# Patient Record
Sex: MALE | Race: WHITE | URBAN - METROPOLITAN AREA
[De-identification: names, ages, dates, MRNs, and addresses within clinical notes are randomized per-mention and may not be internally consistent; named-entity substitution may affect disease eponyms.]

---

## 2017-06-15 ENCOUNTER — OFFICE VISIT (OUTPATIENT)
Dept: OPHTHALMOLOGY | Facility: CLINIC | Age: 82
End: 2017-06-15
Payer: COMMERCIAL

## 2017-06-15 DIAGNOSIS — H35.30 MACULAR DEGENERATION: ICD-10-CM

## 2017-06-15 DIAGNOSIS — H01.023 SQUAMOUS BLEPHARITIS OF BOTH EYES, UNSPECIFIED EYELID: ICD-10-CM

## 2017-06-15 DIAGNOSIS — Z01.01 ENCOUNTER FOR EXAMINATION OF EYES AND VISION WITH ABNORMAL FINDINGS: Primary | ICD-10-CM

## 2017-06-15 DIAGNOSIS — H52.4 PRESBYOPIA: ICD-10-CM

## 2017-06-15 DIAGNOSIS — H01.026 SQUAMOUS BLEPHARITIS OF BOTH EYES, UNSPECIFIED EYELID: ICD-10-CM

## 2017-06-15 DIAGNOSIS — H35.372 EPIRETINAL MEMBRANE, LEFT: ICD-10-CM

## 2017-06-15 DIAGNOSIS — H43.813 POSTERIOR VITREOUS DETACHMENT, BILATERAL: ICD-10-CM

## 2017-06-15 DIAGNOSIS — Z96.1 PSEUDOPHAKIA: ICD-10-CM

## 2017-06-15 PROCEDURE — 92014 COMPRE OPH EXAM EST PT 1/>: CPT | Performed by: OPHTHALMOLOGY

## 2017-06-15 PROCEDURE — 92015 DETERMINE REFRACTIVE STATE: CPT | Performed by: OPHTHALMOLOGY

## 2017-06-15 ASSESSMENT — VISUAL ACUITY
OD_CC: J2
OS_SC: 20/20-2
METHOD: SNELLEN - LINEAR
OS_CC: J1
OD_SC: 20/25+3

## 2017-06-15 ASSESSMENT — SLIT LAMP EXAM - LIDS
COMMENTS: 2+ DERMATOCHALASIS - UPPER LID, 2+ MEIBOMIAN GLAND DYSFUNCTION
COMMENTS: 2+ DERMATOCHALASIS - UPPER LID, 2+ MEIBOMIAN GLAND DYSFUNCTION

## 2017-06-15 ASSESSMENT — CONF VISUAL FIELD
OS_NORMAL: 1
OD_NORMAL: 1

## 2017-06-15 ASSESSMENT — TONOMETRY
OD_IOP_MMHG: 10
IOP_METHOD: APPLANATION
OS_IOP_MMHG: 10

## 2017-06-15 ASSESSMENT — REFRACTION_WEARINGRX
OD_SPHERE: +0.25
OS_CYLINDER: +0.75
OS_AXIS: 177
OS_ADD: +2.75
OD_AXIS: 007
SPECS_TYPE: BIFOCAL
OD_ADD: +2.75
OS_SPHERE: -0.25
OD_CYLINDER: +0.50

## 2017-06-15 ASSESSMENT — CUP TO DISC RATIO
OS_RATIO: 0.1
OD_RATIO: 0.1

## 2017-06-15 ASSESSMENT — REFRACTION_MANIFEST
OD_ADD: +2.75
OS_SPHERE: -0.50
OD_CYLINDER: +0.75
OD_SPHERE: +0.50
OS_AXIS: 175
OD_AXIS: 180
OS_CYLINDER: +1.00
OS_ADD: +2.75

## 2017-06-15 ASSESSMENT — EXTERNAL EXAM - RIGHT EYE: OD_EXAM: PROLAPSED FAT PADS: UPPER, LOWER, ROSACEA

## 2017-06-15 ASSESSMENT — EXTERNAL EXAM - LEFT EYE: OS_EXAM: PROLAPSED FAT PADS: UPPER, LOWER, ROSACEA

## 2017-06-15 NOTE — MR AVS SNAPSHOT
"              After Visit Summary   6/15/2017    Abe Nichole    MRN: [id number]           Patient Information     Date Of Birth          7/30/1927        Visit Information        Provider Department      6/15/2017 2:00 PM Miguel Lamar MD [facility]        Today's Diagnoses     Encounter for examination of eyes and vision with abnormal findings    -  1    Presbyopia        Posterior vitreous detachment, bilateral        Squamous blepharitis of both eyes, unspecified eyelid        Epiretinal membrane, mild, left        Pseudophakia, ou        Macular degeneration of both eyes          Care Instructions    Possible clouding of posterior capsule discussed.   Take a multiple vitamin or \"eye vitamin\" daily.  Protect your eyes outdoors from ultraviolet rays with sunglasses and/or brimmed hat.  Have spinach (cooked or raw), colorful fruits, walnuts, hazelnuts, almonds in your diet.  Monitor the vision in each eye weekly - call if any sudden persistent changes.   Glasses Rx given - optional   Call in February 2018 for an appointment in June 2018 for Complete Exam    Dr. Lamar [phone]          Follow-ups after your visit        Who to contact     If you have questions or need follow up information about today's clinic visit or your schedule please contact [facility] directly at [phone].  Normal or non-critical lab and imaging results will be communicated to you by MyChart, letter or phone within 4 business days after the clinic has received the results. If you do not hear from us within 7 days, please contact the clinic through MyChart or phone. If you have a critical or abnormal lab result, we will notify you by phone as soon as possible.  Submit refill requests through [employer] or call your pharmacy and they will forward the refill request to us. Please allow 3 business days for your refill to be completed.          Additional Information About Your Visit        MyChart " "Information     [employer] lets you send messages to your doctor, view your test results, renew your prescriptions, schedule appointments and more. To sign up, go to [email or web address] . Click on \"Log in\" on the left side of the screen, which will take you to the Welcome page. Then click on \"Sign up Now\" on the right side of the page.     You will be asked to enter the access code listed below, as well as some personal information. Please follow the directions to create your username and password.     Your access code is: [email or web address]  Expires: 2017  3:05 PM     Your access code will  in 90 days. If you need help or a new code, please call your [place] clinic or [phone].        Care EveryWhere ID     This is your Care EveryWhere ID. This could be used by other organizations to access your [place] medical records  [id number]         Blood Pressure from Last 3 Encounters:   13 118/52   13 130/50   06/10/13 116/54    Weight from Last 3 Encounters:   13 99.3 kg (219 lb)   13 100.3 kg (221 lb 3.2 oz)   06/10/13 99.8 kg (220 lb)              We Performed the Following     EYE EXAM (SIMPLE-NONBILLABLE)     REFRACTIVE STATUS        Primary Care Provider    Md Other Clinic                Thank you!     Thank you for choosing [facility]  for your care. Our goal is always to provide you with excellent care. Hearing back from our patients is one way we can continue to improve our services. Please take a few minutes to complete the written survey that you may receive in the mail after your visit with us. Thank you!             Your Updated Medication List - Protect others around you: Learn how to safely use, store and throw away your medicines at www.disposemymeds.org.          This list is accurate as of: 6/15/17  3:05 PM.  Always use your most recent med list.                   Brand Name Dispense Instructions for use    acetaminophen 500 MG tablet    TYLENOL     Take "  by mouth every 6 hours. 1 1/2 tablets.       * albuterol 108 (90 BASE) MCG/ACT Inhaler   Generic drug:  albuterol     1 Inhaler    Inhale 2 puffs into the lungs every 4 hours as needed for shortness of breath / dyspnea.       * albuterol (2.5 MG/3ML) 0.083% neb solution     11 Box    Take 3 mLs by nebulization 4 times daily.       aspirin 325 MG EC tablet      Take 325 mg by mouth daily.       CALCIUM 600 PO      1 DAILY       diltiazem 180 MG 24 hr capsule    DILACOR XR    90 capsule    Take 1 capsule by mouth daily.       fish oil-omega-3 fatty acids 1000 MG capsule      2 CAPS DAILY       GLUCOSAMINE CHONDROITIN COMPLX PO      Take  by mouth daily.       HYALURONIC ACID PO      Take 50 mg by mouth daily.       loratadine 10 MG tablet    CLARITIN     1 TABLET DAILY       Lovastatin 40 MG Tb24     180 tablet    Take 2 tablets by mouth daily.       MULTI-VITAMIN PO      1 DAILY       nitroglycerin 0.4 MG sublingual tablet    NITROSTAT    25    ONE TABLET UNDER TONGUE, FOR CHEST PAIN, AS DIRECTED;1 every 5 mins ;max of 3 in 15 mins.       order for DME     1 Units    Equipment being ordered: posterior-op shoe       spironolactone-hydrochlorothiazide 25-25 MG per tablet    ALDACTAZIDE    180 tablet    Take 1 tablet by mouth 2 times daily.       sulfacetamide-prednisoLONE 10-0.23 % ophthalmic solution    VASOCIDIN    5 mL    Place 1 drop into both eyes 2 times daily as needed.       vitamin D 2000 UNITS tablet      1 TAB ONCE DAILY       * Notice:  This list has 2 medication(s) that are the same as other medications prescribed for you. Read the directions carefully, and ask your doctor or other care provider to review them with you.

## 2017-06-15 NOTE — PROGRESS NOTES
" Current Eye Medications:  None     Subjective:  Complete exam, both eyes doing well, no new issues or concerns.     Objective:  See Ophthalmology Exam.       Assessment:  Stable eye exam.      ICD-10-CM    1. Encounter for examination of eyes and vision with abnormal findings Z01.01 REFRACTIVE STATUS   2. Presbyopia H52.4 REFRACTIVE STATUS   3. Pseudophakia, ou Z96.1 EYE EXAM (SIMPLE-NONBILLABLE)   4. Macular degeneration, dry, mild, ou H35.30    5. Epiretinal membrane, mild, left H35.372    6. 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 Posterior vitreous detachment, bilateral H43.813         Plan: Possible clouding of posterior capsule discussed.   Take a multiple vitamin or \"eye vitamin\" daily.  Protect your eyes outdoors from ultraviolet rays with sunglasses and/or brimmed hat.  Have spinach (cooked or raw), colorful fruits, walnuts, hazelnuts, almonds in your diet.  Monitor the vision in each eye weekly - call if any sudden persistent changes.   Glasses Rx given - optional   Call in February 2018 for an appointment in June 2018 for Complete Exam    Dr. Lamar [phone]    "

## 2017-06-15 NOTE — PATIENT INSTRUCTIONS
"Possible clouding of posterior capsule discussed.   Take a multiple vitamin or \"eye vitamin\" daily.  Protect your eyes outdoors from ultraviolet rays with sunglasses and/or brimmed hat.  Have spinach (cooked or raw), colorful fruits, walnuts, hazelnuts, almonds in your diet.  Monitor the vision in each eye weekly - call if any sudden persistent changes.   Glasses Rx given - optional   Call in February 2018 for an appointment in June 2018 for Complete Exam    Dr. Lamar [phone]  "

## 2017-06-17 PROBLEM — H35.30 MACULAR DEGENERATION: Status: ACTIVE | Noted: 2017-06-17

## 2017-06-17 PROBLEM — H35.30 MACULAR DEGENERATION OF BOTH EYES: Status: RESOLVED | Noted: 2017-06-15 | Resolved: 2017-06-17

## 2018-06-22 ENCOUNTER — OFFICE VISIT (OUTPATIENT)
Dept: OPHTHALMOLOGY | Facility: CLINIC | Age: 83
End: 2018-06-22
Payer: COMMERCIAL

## 2018-06-22 DIAGNOSIS — H43.813 POSTERIOR VITREOUS DETACHMENT, BILATERAL: ICD-10-CM

## 2018-06-22 DIAGNOSIS — H01.026 SQUAMOUS BLEPHARITIS OF BOTH EYES, UNSPECIFIED EYELID: ICD-10-CM

## 2018-06-22 DIAGNOSIS — H35.30 MACULAR DEGENERATION: ICD-10-CM

## 2018-06-22 DIAGNOSIS — H52.4 PRESBYOPIA: ICD-10-CM

## 2018-06-22 DIAGNOSIS — Z01.01 ENCOUNTER FOR EXAMINATION OF EYES AND VISION WITH ABNORMAL FINDINGS: Primary | ICD-10-CM

## 2018-06-22 DIAGNOSIS — Z96.1 PSEUDOPHAKIA: ICD-10-CM

## 2018-06-22 DIAGNOSIS — H35.372 EPIRETINAL MEMBRANE, LEFT: ICD-10-CM

## 2018-06-22 DIAGNOSIS — H01.023 SQUAMOUS BLEPHARITIS OF BOTH EYES, UNSPECIFIED EYELID: ICD-10-CM

## 2018-06-22 PROCEDURE — 92014 COMPRE OPH EXAM EST PT 1/>: CPT | Performed by: OPHTHALMOLOGY

## 2018-06-22 PROCEDURE — 92015 DETERMINE REFRACTIVE STATE: CPT | Performed by: OPHTHALMOLOGY

## 2018-06-22 ASSESSMENT — REFRACTION_MANIFEST
OD_AXIS: 005
OS_SPHERE: -1.00
OD_ADD: +3.00
OD_SPHERE: +0.25
OS_CYLINDER: +0.75
OD_CYLINDER: +0.75
OS_AXIS: 005
OS_ADD: +3.00

## 2018-06-22 ASSESSMENT — VISUAL ACUITY
OS_CC: 20/40
OS_CC+: -1
OS_CC: J1+
METHOD: SNELLEN - LINEAR
CORRECTION_TYPE: GLASSES
OD_CC: J1+
OD_CC: 20/30+-

## 2018-06-22 ASSESSMENT — REFRACTION_WEARINGRX
OD_CYLINDER: +0.50
OS_CYLINDER: +0.75
SPECS_TYPE: BIFOCAL
OD_ADD: +2.75
OS_SPHERE: -0.25
OD_AXIS: 007
OS_AXIS: 177
OD_SPHERE: +0.25
OS_ADD: +2.75

## 2018-06-22 ASSESSMENT — CONF VISUAL FIELD
OS_NORMAL: 1
OD_NORMAL: 1

## 2018-06-22 ASSESSMENT — EXTERNAL EXAM - LEFT EYE: OS_EXAM: PROLAPSED FAT PADS: UPPER, LOWER, ROSACEA

## 2018-06-22 ASSESSMENT — CUP TO DISC RATIO
OD_RATIO: 0.1
OS_RATIO: 0.1

## 2018-06-22 ASSESSMENT — TONOMETRY
IOP_METHOD: APPLANATION
OD_IOP_MMHG: 10
OS_IOP_MMHG: 10

## 2018-06-22 ASSESSMENT — EXTERNAL EXAM - RIGHT EYE: OD_EXAM: PROLAPSED FAT PADS: UPPER, LOWER, ROSACEA

## 2018-06-22 NOTE — PATIENT INSTRUCTIONS
"Glasses Rx given - optional.  Possible clouding of posterior capsule both eyes discussed.  Take a multiple vitamin or \"eye vitamin\" daily.  Protect your eyes outdoors from ultraviolet rays with sunglasses and/or brimmed hat.  Have spinach (cooked or raw), colorful fruits, walnuts, hazelnuts, almonds in your diet.  Monitor the vision in each eye weekly - call if any sudden persistent changes.  Call in February 2019 for an appointment in June 2019 for Complete Exam.    Dr. Lamar [phone]    "

## 2018-06-22 NOTE — PROGRESS NOTES
" Current Eye Medications:  None     Subjective:  Complete eye exam    Doing OK.  Little trouble reading, but not too much     Objective:  See Ophthalmology Exam.       Assessment:  Stable eye exam.      ICD-10-CM    1. Encounter for examination of eyes and vision with abnormal findings Z01.01 REFRACTIVE STATUS   2. Presbyopia H52.4 REFRACTIVE STATUS   3. Pseudophakia, ou Z96.1 EYE EXAM (SIMPLE-NONBILLABLE)   4. Macular degeneration, dry, mild, ou H35.30    5. Epiretinal membrane, mild, left H35.372    6. 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 Posterior vitreous detachment, bilateral H43.813         Plan: Glasses Rx given - optional.  Possible clouding of posterior capsule both eyes discussed.  Take a multiple vitamin or \"eye vitamin\" daily.  Protect your eyes outdoors from ultraviolet rays with sunglasses and/or brimmed hat.  Have spinach (cooked or raw), colorful fruits, walnuts, hazelnuts, almonds in your diet.  Monitor the vision in each eye weekly - call if any sudden persistent changes.  Call in February 2019 for an appointment in June 2019 for Complete Exam.    Dr. Lamar [phone]       "

## 2018-06-22 NOTE — MR AVS SNAPSHOT
"              After Visit Summary   6/22/2018    Abe Nichole    MRN: [id number]           Patient Information     Date Of Birth          7/30/1927        Visit Information        Provider Department      6/22/2018 1:00 PM Miguel Lamar MD [facility]        Today's Diagnoses     Encounter for examination of eyes and vision with abnormal findings    -  1    Presbyopia        Macular degeneration, dry, mild, ou        Posterior vitreous detachment, bilateral        Squamous blepharitis of both eyes, unspecified eyelid          Care Instructions    Glasses Rx given - optional.  Possible clouding of posterior capsule both eyes discussed.  Take a multiple vitamin or \"eye vitamin\" daily.  Protect your eyes outdoors from ultraviolet rays with sunglasses and/or brimmed hat.  Have spinach (cooked or raw), colorful fruits, walnuts, hazelnuts, almonds in your diet.  Monitor the vision in each eye weekly - call if any sudden persistent changes.  Call in February 2019 for an appointment in June 2019 for Complete Exam.    Dr. Lamar [phone]            Follow-ups after your visit        Who to contact     If you have questions or need follow up information about today's clinic visit or your schedule please contact [facility] directly at [phone].  Normal or non-critical lab and imaging results will be communicated to you by MyChart, letter or phone within 4 business days after the clinic has received the results. If you do not hear from us within 7 days, please contact the clinic through MyChart or phone. If you have a critical or abnormal lab result, we will notify you by phone as soon as possible.  Submit refill requests through [employer] or call your pharmacy and they will forward the refill request to us. Please allow 3 business days for your refill to be completed.          Additional Information About Your Visit        Care EveryWhere ID     This is your Care EveryWhere ID. " This could be used by other organizations to access your [place] medical records  [id number]         Blood Pressure from Last 3 Encounters:   09/19/13 118/52   07/25/13 130/50   06/10/13 116/54    Weight from Last 3 Encounters:   09/19/13 99.3 kg (219 lb)   07/25/13 100.3 kg (221 lb 3.2 oz)   06/10/13 99.8 kg (220 lb)              Today, you had the following     No orders found for display       Primary Care Provider Fax #    Physician No Ref-Primary [phone]       No address on file        Equal Access to Services     [facility]: Hadii gordy Santiago, wabridgerda armen, jenny hammermaherbie carlson, bozena rutherford . So [facility] [phone].    ATENCIÓN: Si habla español, tiene a dumont disposición servicios gratuitos de asistencia lingüística. Llame al [phone].    We comply with applicable federal civil rights laws and [place] laws. We do not discriminate on the basis of race, color, national origin, age, disability, sex, sexual orientation, or gender identity.            Thank you!     Thank you for choosing [facility]  for your care. Our goal is always to provide you with excellent care. Hearing back from our patients is one way we can continue to improve our services. Please take a few minutes to complete the written survey that you may receive in the mail after your visit with us. Thank you!             Your Updated Medication List - Protect others around you: Learn how to safely use, store and throw away your medicines at www.disposemymeds.org.          This list is accurate as of 6/22/18  1:42 PM.  Always use your most recent med list.                   Brand Name Dispense Instructions for use Diagnosis    acetaminophen 500 MG tablet    TYLENOL     Take  by mouth every 6 hours. 1 1/2 tablets.        aspirin 325 MG EC tablet      Take 325 mg by mouth daily.        CALCIUM 600 PO      1 DAILY        diltiazem 180 MG 24 hr capsule    DILACOR XR    90 capsule     Take 1 capsule by mouth daily.    Hypertension       fish oil-omega-3 fatty acids 1000 MG capsule      2 CAPS DAILY        GLUCOSAMINE CHONDROITIN COMPLX PO      Take  by mouth daily.        HYALURONIC ACID PO      Take 50 mg by mouth daily.        loratadine 10 MG tablet    CLARITIN     1 TABLET DAILY        Lovastatin 40 MG Tb24     180 tablet    Take 2 tablets by mouth daily.    Hyperlipidemia LDL goal < 100       MULTI-VITAMIN PO      1 DAILY        nitroGLYcerin 0.4 MG sublingual tablet    NITROSTAT    25    ONE TABLET UNDER TONGUE, FOR CHEST PAIN, AS DIRECTED;1 every 5 mins ;max of 3 in 15 mins.    Chest pain       order for DME     1 Units    Equipment being ordered: posterior-op shoe        * PROAIR  (90 Base) MCG/ACT Inhaler   Generic drug:  albuterol     1 Inhaler    Inhale 2 puffs into the lungs every 4 hours as needed for shortness of breath / dyspnea.    COPD (chronic obstructive pulmonary disease) (H)       * albuterol (2.5 MG/3ML) 0.083% neb solution     11 Box    Take 3 mLs by nebulization 4 times daily.    COPD (chronic obstructive pulmonary disease) (H)       spironolactone-hydrochlorothiazide 25-25 MG per tablet    ALDACTAZIDE    180 tablet    Take 1 tablet by mouth 2 times daily.    Hypertension       sulfacetamide-prednisoLONE 10-0.23 % ophthalmic solution    VASOCIDIN    5 mL    Place 1 drop into both eyes 2 times daily as needed.    Blepharitis       vitamin D 2000 units tablet      1 TAB ONCE DAILY        * Notice:  This list has 2 medication(s) that are the same as other medications prescribed for you. Read the directions carefully, and ask your doctor or other care provider to review them with you.

## 2018-06-22 NOTE — LETTER
"    6/22/2018         RE: Abe Nichole  [address]  [place] [address]        Dear Colleague,    Thank you for referring your patient, Abe Nichole, to the [facility]. Please see a copy of my visit note below.     Current Eye Medications:  None     Subjective:  Complete eye exam    Doing OK.  Little trouble reading, but not too much     Objective:  See Ophthalmology Exam.       Assessment:  Stable eye exam.      ICD-10-CM    1. Encounter for examination of eyes and vision with abnormal findings Z01.01 REFRACTIVE STATUS   2. Presbyopia H52.4 REFRACTIVE STATUS   3. Pseudophakia, ou Z96.1 EYE EXAM (SIMPLE-NONBILLABLE)   4. Macular degeneration, dry, mild, ou H35.30    5. Epiretinal membrane, mild, left H35.372    6. 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 Posterior vitreous detachment, bilateral H43.813         Plan: Glasses Rx given - optional.  Possible clouding of posterior capsule both eyes discussed.  Take a multiple vitamin or \"eye vitamin\" daily.  Protect your eyes outdoors from ultraviolet rays with sunglasses and/or brimmed hat.  Have spinach (cooked or raw), colorful fruits, walnuts, hazelnuts, almonds in your diet.  Monitor the vision in each eye weekly - call if any sudden persistent changes.  Call in February 2019 for an appointment in June 2019 for Complete Exam.    Dr. Lamar [phone]         Again, thank you for allowing me to participate in the care of your patient.        Sincerely,        Miguel Lamar MD    "

## 2018-09-28 ENCOUNTER — OFFICE VISIT (OUTPATIENT)
Dept: OPHTHALMOLOGY | Facility: CLINIC | Age: 83
End: 2018-09-28
Payer: COMMERCIAL

## 2018-09-28 DIAGNOSIS — H50.00 ESODEVIATION: ICD-10-CM

## 2018-09-28 DIAGNOSIS — H53.2 DIPLOPIA: Primary | ICD-10-CM

## 2018-09-28 PROCEDURE — 92012 INTRM OPH EXAM EST PATIENT: CPT | Performed by: OPHTHALMOLOGY

## 2018-09-28 ASSESSMENT — REFRACTION_FINALRX
OS_HBASE: OUT
OS_HPRISM: 2.0

## 2018-09-28 ASSESSMENT — CONF VISUAL FIELD
OD_NORMAL: 1
OS_NORMAL: 1

## 2018-09-28 ASSESSMENT — VISUAL ACUITY
OS_CC: 20/25
OD_CC: 20/25
CORRECTION_TYPE: GLASSES
METHOD: SNELLEN - LINEAR

## 2018-09-28 ASSESSMENT — EXTERNAL EXAM - RIGHT EYE: OD_EXAM: PROLAPSED FAT PADS: UPPER, LOWER, ROSACEA

## 2018-09-28 ASSESSMENT — EXTERNAL EXAM - LEFT EYE: OS_EXAM: PROLAPSED FAT PADS: UPPER, LOWER, ROSACEA

## 2018-09-28 NOTE — PATIENT INSTRUCTIONS
Discontinue using Visine drops both eyes.  Try using artifical tears up to 4 times daily both eyes as needed.  (Refresh Tears, Systane Ultra/Balance, or Theratears)  Recommend trying temporary prism on left lens.  Return visit in 1 month for Muscle Check.     Miguel Lamar M.D.  [phone]

## 2018-09-28 NOTE — PROGRESS NOTES
Current Eye Medications:  None     Subjective:  Motility     Patient complains that with head movement things get blurry.  Like trying to see under water without goggles. He saw Dr. Hall and was told to have his eyes checked (motility).    On tapering dose of Prednisone.  Had scans at [place].    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       Assessment:  Possible symptomatic esodeviation.      Plan:  Discontinue using Visine drops both eyes.  Try using artifical tears up to 4 times daily both eyes as needed.  (Refresh Tears, Systane Ultra/Balance, or Theratears)  Recommend trying temporary prism on left lens.  Return visit in 1 month for Muscle Check.     Miguel Lamar M.D.  [phone]

## 2018-09-28 NOTE — MR AVS SNAPSHOT
After Visit Summary   9/28/2018    Abe Nichole    MRN: [id number]           Patient Information     Date Of Birth          7/30/1927        Visit Information        Provider Department      9/28/2018 11:00 AM Miguel Lamar MD [facility]        Today's Diagnoses     Diplopia    -  1      Care Instructions    Discontinue using Visine drops both eyes.  Try using artifical tears up to 4 times daily both eyes as needed.  (Refresh Tears, Systane Ultra/Balance, or Theratears)  Recommend trying temporary prism on left lens.  Return visit in 1 month for Muscle Check.     Miguel Lamar M.D.  [phone]            Follow-ups after your visit        Who to contact     If you have questions or need follow up information about today's clinic visit or your schedule please contact [facility] directly at [phone].  Normal or non-critical lab and imaging results will be communicated to you by MyChart, letter or phone within 4 business days after the clinic has received the results. If you do not hear from us within 7 days, please contact the clinic through MyChart or phone. If you have a critical or abnormal lab result, we will notify you by phone as soon as possible.  Submit refill requests through [employer] or call your pharmacy and they will forward the refill request to us. Please allow 3 business days for your refill to be completed.          Additional Information About Your Visit        Care EveryWhere ID     This is your Care EveryWhere ID. This could be used by other organizations to access your [place] medical records  [id number]         Blood Pressure from Last 3 Encounters:   09/19/13 118/52   07/25/13 130/50   06/10/13 116/54    Weight from Last 3 Encounters:   09/19/13 99.3 kg (219 lb)   07/25/13 100.3 kg (221 lb 3.2 oz)   06/10/13 99.8 kg (220 lb)              Today, you had the following     No orders found for display       Primary Care Provider Fax #     Physician No Ref-Primary [phone]       No address on file        Equal Access to Services     [facility] : Hadii gordy rogers toby Santiago, wabridgerda joseshivha, jenny sherryarden heidinikkiherbie, bozena carrillo cindyanya lomaxronaljosiah hung. So [facility] [phone].    ATENCIÓN: Si habla español, tiene a dumont disposición servicios gratuitos de asistencia lingüística. Llame al [phone].    We comply with applicable federal civil rights laws and [place] laws. We do not discriminate on the basis of race, color, national origin, age, disability, sex, sexual orientation, or gender identity.            Thank you!     Thank you for choosing [facility]  for your care. Our goal is always to provide you with excellent care. Hearing back from our patients is one way we can continue to improve our services. Please take a few minutes to complete the written survey that you may receive in the mail after your visit with us. Thank you!             Your Updated Medication List - Protect others around you: Learn how to safely use, store and throw away your medicines at www.disposemymeds.org.          This list is accurate as of 9/28/18 11:56 AM.  Always use your most recent med list.                   Brand Name Dispense Instructions for use Diagnosis    acetaminophen 500 MG tablet    TYLENOL     Take  by mouth every 6 hours. 1 1/2 tablets.        aspirin 325 MG EC tablet      Take 325 mg by mouth daily.        CALCIUM 600 PO      1 DAILY        diltiazem 180 MG 24 hr capsule    DILACOR XR    90 capsule    Take 1 capsule by mouth daily.    Hypertension       fish oil-omega-3 fatty acids 1000 MG capsule      2 CAPS DAILY        GLUCOSAMINE CHONDROITIN COMPLX PO      Take  by mouth daily.        HYALURONIC ACID PO      Take 50 mg by mouth daily.        loratadine 10 MG tablet    CLARITIN     1 TABLET DAILY        Lovastatin 40 MG Tb24     180 tablet    Take 2 tablets by mouth daily.    Hyperlipidemia LDL goal < 100        MULTI-VITAMIN PO      1 DAILY        nitroGLYcerin 0.4 MG sublingual tablet    NITROSTAT    25    ONE TABLET UNDER TONGUE, FOR CHEST PAIN, AS DIRECTED;1 every 5 mins ;max of 3 in 15 mins.    Chest pain       order for DME     1 Units    Equipment being ordered: posterior-op shoe        * PROAIR  (90 Base) MCG/ACT inhaler   Generic drug:  albuterol     1 Inhaler    Inhale 2 puffs into the lungs every 4 hours as needed for shortness of breath / dyspnea.    COPD (chronic obstructive pulmonary disease) (H)       * albuterol (2.5 MG/3ML) 0.083% neb solution     11 Box    Take 3 mLs by nebulization 4 times daily.    COPD (chronic obstructive pulmonary disease) (H)       spironolactone-hydrochlorothiazide 25-25 MG per tablet    ALDACTAZIDE    180 tablet    Take 1 tablet by mouth 2 times daily.    Hypertension       sulfacetamide-prednisoLONE 10-0.23 % ophthalmic solution    VASOCIDIN    5 mL    Place 1 drop into both eyes 2 times daily as needed.    Blepharitis       vitamin D 2000 units tablet      1 TAB ONCE DAILY        * Notice:  This list has 2 medication(s) that are the same as other medications prescribed for you. Read the directions carefully, and ask your doctor or other care provider to review them with you.

## 2018-09-28 NOTE — LETTER
9/28/2018         RE: Abe Nichole  [address]  [place] [address]        Dear Colleague,    Thank you for referring your patient, Abe Nichole, to the [facility]. Please see a copy of my visit note below.     Current Eye Medications:  None     Subjective:  Motility     Patient complains that with head movement things get blurry.  Like trying to see under water without goggles. He saw Dr. Hall and was told to have his eyes checked (motility).    On tapering dose of Prednisone.  Had scans at [place].     Objective:  See Ophthalmology Exam.       Assessment:  Possible symptomatic esodeviation.      Plan:  Discontinue using Visine drops both eyes.  Try using artifical tears up to 4 times daily both eyes as needed.  (Refresh Tears, Systane Ultra/Balance, or Theratears)  Recommend trying temporary prism on left lens.  Return visit in 1 month for Muscle Check.     Miguel Lamar M.D.  [phone]         Again, thank you for allowing me to participate in the care of your patient.        Sincerely,        Miguel Lamar MD

## 2018-11-13 ENCOUNTER — OFFICE VISIT (OUTPATIENT)
Dept: OPHTHALMOLOGY | Facility: CLINIC | Age: 83
End: 2018-11-13
Payer: COMMERCIAL

## 2018-11-13 DIAGNOSIS — R42 DIZZINESS: ICD-10-CM

## 2018-11-13 DIAGNOSIS — H10.403 CHRONIC CONJUNCTIVITIS OF BOTH EYES, UNSPECIFIED CHRONIC CONJUNCTIVITIS TYPE: ICD-10-CM

## 2018-11-13 DIAGNOSIS — H50.00 ESOTROPIA: Primary | ICD-10-CM

## 2018-11-13 PROCEDURE — 92012 INTRM OPH EXAM EST PATIENT: CPT | Performed by: OPHTHALMOLOGY

## 2018-11-13 RX ORDER — NEOMYCIN POLYMYXIN B SULFATES AND DEXAMETHASONE 3.5; 10000; 1 MG/ML; [USP'U]/ML; MG/ML
1 SUSPENSION/ DROPS OPHTHALMIC 3 TIMES DAILY
Qty: 1 BOTTLE | Refills: 3 | Status: SHIPPED | OUTPATIENT
Start: 2018-11-13

## 2018-11-13 ASSESSMENT — VISUAL ACUITY
CORRECTION_TYPE: GLASSES
OS_CC+: -2
OS_CC: 20/25
OD_CC: 20/30
METHOD: SNELLEN - LINEAR
OD_CC+: -1

## 2018-11-13 ASSESSMENT — EXTERNAL EXAM - LEFT EYE: OS_EXAM: PROLAPSED FAT PADS: UPPER, LOWER, ROSACEA

## 2018-11-13 ASSESSMENT — EXTERNAL EXAM - RIGHT EYE: OD_EXAM: PROLAPSED FAT PADS: UPPER, LOWER, ROSACEA

## 2018-11-13 NOTE — PROGRESS NOTES
" Current Eye Medications:       Subjective:  Follow up esotropia/fresnel.  Patient is here for a muscle check.  No improvement in diplopia with the fresnel.  He has difficulty focusing when he is moving.  When he is sitting still, his vision is ok, but when he is mobile, his vision \"moves.\"  He is having a lot of balance problems and has to hold on to something when walking.  He has a fresnel prism on his left lens.      Objective:  See Ophthalmology Exam.       Assessment:  No shift with Fresnel prism on glasses lens and no problem when patient static.  Suspect central cause of imbalance and dizziness.  Mild conjunctivitis both eyes.      Plan:  Continue same glasses.  Start Jovan/Dex drops in both eyes three times daily.  Return visit 3 months for Muscle Check.     Miguel Lamar M.D.  [phone]         "

## 2018-11-13 NOTE — LETTER
"    11/13/2018         RE: Abe Nichole  [address]  [place] [address]        Dear Colleague,    Thank you for referring your patient, Abe Nichole, to the [facility]. Please see a copy of my visit note below.     Current Eye Medications:       Subjective:  Follow up esotropia/fresnel.  Patient is here for a muscle check.  No improvement in diplopia with the fresnel.  He has difficulty focusing when he is moving.  When he is sitting still, his vision is ok, but when he is mobile, his vision \"moves.\"  He is having a lot of balance problems and has to hold on to something when walking.  He has a fresnel prism on his left lens.      Objective:  See Ophthalmology Exam.       Assessment:  No shift with Fresnel prism on glasses lens and no problem when patient static.  Suspect central cause of imbalance and dizziness.  Mild conjunctivitis both eyes.      Plan:  Continue same glasses.  Start Jovan/Dex drops in both eyes three times daily.  Return visit 3 months for Muscle Check.     Miguel Lamar M.D.  [phone]           Again, thank you for allowing me to participate in the care of your patient.        Sincerely,        Miguel Lamar MD    "

## 2018-11-13 NOTE — MR AVS SNAPSHOT
After Visit Summary   11/13/2018    Abe Nichole    MRN: [id number]           Patient Information     Date Of Birth          7/30/1927        Visit Information        Provider Department      11/13/2018 2:15 PM Miguel Lamar MD [facility]        Today's Diagnoses     Esotropia    -  1      Care Instructions    Continue same glasses.  Start Jovan/Dex drops in both eyes three times daily.  Return visit 3 months for Muscle Check.     Miguel Lamar M.D.  [phone]            Follow-ups after your visit        Who to contact     If you have questions or need follow up information about today's clinic visit or your schedule please contact [facility] directly at [phone].  Normal or non-critical lab and imaging results will be communicated to you by MyChart, letter or phone within 4 business days after the clinic has received the results. If you do not hear from us within 7 days, please contact the clinic through MyChart or phone. If you have a critical or abnormal lab result, we will notify you by phone as soon as possible.  Submit refill requests through [employer] or call your pharmacy and they will forward the refill request to us. Please allow 3 business days for your refill to be completed.          Additional Information About Your Visit        Care EveryWhere ID     This is your Care EveryWhere ID. This could be used by other organizations to access your [facility] medical records  [id number]         Blood Pressure from Last 3 Encounters:   09/19/13 118/52   07/25/13 130/50   06/10/13 116/54    Weight from Last 3 Encounters:   09/19/13 99.3 kg (219 lb)   07/25/13 100.3 kg (221 lb 3.2 oz)   06/10/13 99.8 kg (220 lb)              Today, you had the following     No orders found for display         Today's Medication Changes          These changes are accurate as of 11/13/18  3:20 PM.  If you have any questions, ask your nurse or doctor.               Start  taking these medicines.        Dose/Directions    neomycin-polymixin-dexamethasone ophthalmic suspension   Commonly known as:  MAXITROL   Used for:  Esotropia   Started by:  Miguel Lamar MD        Dose:  1 drop   Place 1 drop into both eyes 3 times daily   Quantity:  1 Bottle   Refills:  3            Where to get your medicines      These medications were sent to [facility]/pharmacy #[id number] - [place], [place] - [address] [facility]  [address], [place] [address]     Phone:  [phone]     neomycin-polymixin-dexamethasone ophthalmic suspension                Primary Care Provider Fax #    Physician No Ref-Primary [phone]       No address on file        Equal Access to Services     YARY FLANNERY : Brittani Santiago, waaxda lulisaadaha, qaybta kaalmada robyn, bozena hung. So [facility] [phone].    ATENCIÓN: Si habla español, tiene a dumont disposición servicios gratuitos de asistencia lingüística. [facility] [phone].    We comply with applicable federal civil rights laws and [place] laws. We do not discriminate on the basis of race, color, national origin, age, disability, sex, sexual orientation, or gender identity.            Thank you!     Thank you for choosing [facility]  for your care. Our goal is always to provide you with excellent care. Hearing back from our patients is one way we can continue to improve our services. Please take a few minutes to complete the written survey that you may receive in the mail after your visit with us. Thank you!             Your Updated Medication List - Protect others around you: Learn how to safely use, store and throw away your medicines at www.disposemymeds.org.          This list is accurate as of 11/13/18  3:20 PM.  Always use your most recent med list.                   Brand Name Dispense Instructions for use Diagnosis    acetaminophen 500 MG tablet    TYLENOL     Take  by  mouth every 6 hours. 1 1/2 tablets.        aspirin 325 MG EC tablet      Take 325 mg by mouth daily.        CALCIUM 600 PO      1 DAILY        diltiazem 180 MG 24 hr capsule    DILACOR XR    90 capsule    Take 1 capsule by mouth daily.    Hypertension       fish oil-omega-3 fatty acids 1000 MG capsule      2 CAPS DAILY        GLUCOSAMINE CHONDROITIN COMPLX PO      Take  by mouth daily.        HYALURONIC ACID PO      Take 50 mg by mouth daily.        loratadine 10 MG tablet    CLARITIN     1 TABLET DAILY        Lovastatin 40 MG Tb24     180 tablet    Take 2 tablets by mouth daily.    Hyperlipidemia LDL goal < 100       MULTI-VITAMIN PO      1 DAILY        neomycin-polymixin-dexamethasone ophthalmic suspension    MAXITROL    1 Bottle    Place 1 drop into both eyes 3 times daily    Esotropia       nitroGLYcerin 0.4 MG sublingual tablet    NITROSTAT    25    ONE TABLET UNDER TONGUE, FOR CHEST PAIN, AS DIRECTED;1 every 5 mins ;max of 3 in 15 mins.    Chest pain       order for DME     1 Units    Equipment being ordered: posterior-op shoe        * PROAIR  (90 Base) MCG/ACT inhaler   Generic drug:  albuterol     1 Inhaler    Inhale 2 puffs into the lungs every 4 hours as needed for shortness of breath / dyspnea.    COPD (chronic obstructive pulmonary disease) (H)       * albuterol (2.5 MG/3ML) 0.083% neb solution     11 Box    Take 3 mLs by nebulization 4 times daily.    COPD (chronic obstructive pulmonary disease) (H)       spironolactone-hydrochlorothiazide 25-25 MG per tablet    ALDACTAZIDE    180 tablet    Take 1 tablet by mouth 2 times daily.    Hypertension       sulfacetamide-prednisoLONE 10-0.23 % ophthalmic solution    VASOCIDIN    5 mL    Place 1 drop into both eyes 2 times daily as needed.    Blepharitis       vitamin D 2000 units tablet      1 TAB ONCE DAILY        * Notice:  This list has 2 medication(s) that are the same as other medications prescribed for you. Read the directions carefully, and ask  your doctor or other care provider to review them with you.

## 2018-11-13 NOTE — PATIENT INSTRUCTIONS
Continue same glasses.  Start Jovan/Dex drops in both eyes three times daily.  Return visit 3 months for Muscle Check.     Miguel Lamar M.D.  [phone]

## 2018-11-27 ENCOUNTER — TELEPHONE (OUTPATIENT)
Dept: OPHTHALMOLOGY | Facility: CLINIC | Age: 83
End: 2018-11-27

## 2018-11-27 NOTE — TELEPHONE ENCOUNTER
Pt called and reported that the poly/dex is not making his eyes any better, left eye feels more so, Pt wants to know what else can be done, I spoke with Dr. Lamar and he recommended that pt stop these drop an switch to art. Tears and use 3-4 times a day for the next few weeks and if things do not improve can make appt to be seen , I passe all his information to the pt .

## 2019-06-27 ENCOUNTER — OFFICE VISIT (OUTPATIENT)
Dept: OPHTHALMOLOGY | Facility: CLINIC | Age: 84
End: 2019-06-27
Payer: COMMERCIAL

## 2019-06-27 DIAGNOSIS — H01.026 SQUAMOUS BLEPHARITIS OF BOTH EYES, UNSPECIFIED EYELID: ICD-10-CM

## 2019-06-27 DIAGNOSIS — Z01.01 ENCOUNTER FOR EXAMINATION OF EYES AND VISION WITH ABNORMAL FINDINGS: Primary | ICD-10-CM

## 2019-06-27 DIAGNOSIS — H35.3131 EARLY DRY STAGE NONEXUDATIVE AGE-RELATED MACULAR DEGENERATION OF BOTH EYES: ICD-10-CM

## 2019-06-27 DIAGNOSIS — H52.4 PRESBYOPIA: ICD-10-CM

## 2019-06-27 DIAGNOSIS — H01.023 SQUAMOUS BLEPHARITIS OF BOTH EYES, UNSPECIFIED EYELID: ICD-10-CM

## 2019-06-27 DIAGNOSIS — Z96.1 PSEUDOPHAKIA: ICD-10-CM

## 2019-06-27 DIAGNOSIS — H43.813 POSTERIOR VITREOUS DETACHMENT, BILATERAL: ICD-10-CM

## 2019-06-27 DIAGNOSIS — H35.372 EPIRETINAL MEMBRANE, LEFT: ICD-10-CM

## 2019-06-27 PROCEDURE — 92014 COMPRE OPH EXAM EST PT 1/>: CPT | Performed by: OPHTHALMOLOGY

## 2019-06-27 PROCEDURE — 92134 CPTRZ OPH DX IMG PST SGM RTA: CPT | Performed by: OPHTHALMOLOGY

## 2019-06-27 PROCEDURE — 92015 DETERMINE REFRACTIVE STATE: CPT | Performed by: OPHTHALMOLOGY

## 2019-06-27 ASSESSMENT — CUP TO DISC RATIO
OD_RATIO: 0.1
OS_RATIO: 0.1

## 2019-06-27 ASSESSMENT — REFRACTION_WEARINGRX
OS_HPRISM: 2 BO
SPECS_TYPE: BIFOCAL
OS_CYLINDER: +0.75
OS_SPHERE: -0.25
OD_SPHERE: +0.25
OD_CYLINDER: +0.50
OD_ADD: +2.75
OD_AXIS: 007
OS_AXIS: 177
OS_ADD: +2.75

## 2019-06-27 ASSESSMENT — VISUAL ACUITY
OD_CC+: -2
OD_CC: J5
CORRECTION_TYPE: GLASSES
OS_CC+: -3
OS_CC: J3
OD_CC: 20/30
OS_CC: 20/30
METHOD: SNELLEN - LINEAR

## 2019-06-27 ASSESSMENT — REFRACTION_MANIFEST
OD_CYLINDER: +1.50
OD_ADD: +3.00
OS_CYLINDER: +1.25
OD_SPHERE: +0.25
OS_AXIS: 169
OD_AXIS: 007
OS_SPHERE: -0.50
OS_ADD: +3.00

## 2019-06-27 ASSESSMENT — CONF VISUAL FIELD
METHOD: COUNTING FINGERS
OS_NORMAL: 1
OD_NORMAL: 1

## 2019-06-27 ASSESSMENT — TONOMETRY
OS_IOP_MMHG: 07
OD_IOP_MMHG: 08
IOP_METHOD: APPLANATION

## 2019-06-27 ASSESSMENT — EXTERNAL EXAM - LEFT EYE: OS_EXAM: PROLAPSED FAT PADS: UPPER, LOWER, ROSACEA

## 2019-06-27 ASSESSMENT — REFRACTION_FINALRX: OS_HPRISM: 2 BO

## 2019-06-27 ASSESSMENT — EXTERNAL EXAM - RIGHT EYE: OD_EXAM: PROLAPSED FAT PADS: UPPER, LOWER, ROSACEA

## 2019-06-27 NOTE — PROGRESS NOTES
" Current Eye Medications:  Pt denies using any eye drops     Subjective:  Pt states that he is not seeing as good as he used to.  Trouble reading fine print.  \"Overall sees allright\"  Pt says he has balance issues and is having a hard time focusing.  Has to stare at an object for a while for object to come into focus. Pt denies any comfort issues with his eyes.     Objective:  See Ophthalmology Exam.       Assessment:  Stable eye exam.      ICD-10-CM    1. Encounter for examination of eyes and vision with abnormal findings Z01.01 REFRACTIVE STATUS   2. Presbyopia H52.4 REFRACTIVE STATUS   3. Pseudophakia, ou Z96.1 EYE EXAM (SIMPLE-NONBILLABLE)   4. Early dry stage nonexudative age-related macular degeneration of both eyes H35.3131    5. Epiretinal membrane, mild, left H35.372    6. 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 Posterior vitreous detachment, bilateral H43.813         Plan:  Possible clouding of posterior capsule both eyes discussed.   Take a multiple vitamin or \"eye vitamin\" daily (AREDS2).  Protect your eyes outdoors from ultraviolet rays with sunglasses and/or brimmed hat.  Have spinach (cooked or raw), colorful fruits, walnuts, hazelnuts, almonds in your diet.  Monitor the vision in each eye weekly - call if any sudden persistent changes.   Glasses Rx given - optional   Call in February 2020 for an appointment in June 2020 for Complete Exam.    Dr. Lamar [phone]    NB: Discussed retinal OCT with patient 7/5/19.  Miguel Lamar M.D.  [phone]           "

## 2019-06-27 NOTE — LETTER
"    6/27/2019         RE: Abe Nichole  [address]  [place] [address]        Dear Colleague,    Thank you for referring your patient, Abe Nichole, to the [facility]. Please see a copy of my visit note below.     Current Eye Medications:  Pt denies using any eye drops     Subjective:  Pt states that he is not seeing as good as he used to.  Trouble reading fine print.  \"Overall sees allright\"  Pt says he has balance issues and is having a hard time focusing.  Has to stare at an object for a while for object to come into focus. Pt denies any comfort issues with his eyes.     Objective:  See Ophthalmology Exam.       Assessment:  Stable eye exam.      ICD-10-CM    1. Encounter for examination of eyes and vision with abnormal findings Z01.01 REFRACTIVE STATUS   2. Presbyopia H52.4 REFRACTIVE STATUS   3. Pseudophakia, ou Z96.1 EYE EXAM (SIMPLE-NONBILLABLE)   4. Early dry stage nonexudative age-related macular degeneration of both eyes H35.3131    5. Epiretinal membrane, mild, left H35.372    6. 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 Posterior vitreous detachment, bilateral H43.813         Plan:  Possible clouding of posterior capsule both eyes discussed.   Take a multiple vitamin or \"eye vitamin\" daily (AREDS2).  Protect your eyes outdoors from ultraviolet rays with sunglasses and/or brimmed hat.  Have spinach (cooked or raw), colorful fruits, walnuts, hazelnuts, almonds in your diet.  Monitor the vision in each eye weekly - call if any sudden persistent changes.   Glasses Rx given - optional   Call in February 2020 for an appointment in June 2020 for Complete Exam.    Dr. Lamar [phone]           Again, thank you for allowing me to participate in the care of your patient.        Sincerely,        Miguel Lamar MD    "

## 2019-07-02 ENCOUNTER — TELEPHONE (OUTPATIENT)
Dept: OPHTHALMOLOGY | Facility: CLINIC | Age: 84
End: 2019-07-02

## 2019-07-02 NOTE — TELEPHONE ENCOUNTER
Patient is calling because incomplete voicemail was left regarding his OCT results at his last visit.

## 2019-07-10 ENCOUNTER — TELEPHONE (OUTPATIENT)
Dept: OPHTHALMOLOGY | Facility: CLINIC | Age: 84
End: 2019-07-10

## 2019-07-10 NOTE — TELEPHONE ENCOUNTER
Pt called and stated he would like the fresnel prism place back on the left lens of his glasses that  Dr. Lamar said he could try doing with out , pt states he found he did better having this prism. Pt wife is going to come to the clinic with pts glasses and fresnel prism to have it put back on

## 2019-07-10 NOTE — TELEPHONE ENCOUNTER
Pt want the prism place back in his glasses that HOPE Lamar said he could try doing without it. Pt states he did better with the prism. Pts wife will be stopping by the clinic with pt glasses and fresnel to have pasted back in left lens.

## 2019-07-27 PROBLEM — H35.3131 EARLY DRY STAGE NONEXUDATIVE AGE-RELATED MACULAR DEGENERATION OF BOTH EYES: Status: ACTIVE | Noted: 2019-07-27

## 2019-08-16 ENCOUNTER — TELEPHONE (OUTPATIENT)
Dept: OPHTHALMOLOGY | Facility: CLINIC | Age: 84
End: 2019-08-16

## 2019-08-16 NOTE — TELEPHONE ENCOUNTER
"Abe was given \"prism\" for his glasses for one eye. He is asking if the prism could be put on his glasses for his other eye. Please return phone call: [phone].   "

## 2019-08-26 ENCOUNTER — TELEPHONE (OUTPATIENT)
Dept: OPHTHALMOLOGY | Facility: CLINIC | Age: 84
End: 2019-08-26

## 2019-08-26 NOTE — TELEPHONE ENCOUNTER
Patient stopped in and is wanting fresnel prism in both eyes on the glasses. He has the prism in hand. He does not need any prism in the right eye per .  Darron in [facility] will tell him that he only needs it in the left eye.

## 2019-10-30 ENCOUNTER — DOCUMENTATION ONLY (OUTPATIENT)
Dept: OPHTHALMOLOGY | Facility: CLINIC | Age: 84
End: 2019-10-30